# Patient Record
Sex: FEMALE | Race: WHITE | NOT HISPANIC OR LATINO | Employment: OTHER | URBAN - METROPOLITAN AREA
[De-identification: names, ages, dates, MRNs, and addresses within clinical notes are randomized per-mention and may not be internally consistent; named-entity substitution may affect disease eponyms.]

---

## 2017-02-02 ENCOUNTER — OFFICE VISIT (OUTPATIENT)
Dept: FAMILY MEDICINE CLINIC | Facility: CLINIC | Age: 67
End: 2017-02-02

## 2017-02-02 VITALS
SYSTOLIC BLOOD PRESSURE: 130 MMHG | DIASTOLIC BLOOD PRESSURE: 90 MMHG | HEART RATE: 61 BPM | BODY MASS INDEX: 33.65 KG/M2 | OXYGEN SATURATION: 98 % | WEIGHT: 189.9 LBS | HEIGHT: 63 IN

## 2017-02-02 DIAGNOSIS — F33.2 SEVERE EPISODE OF RECURRENT MAJOR DEPRESSIVE DISORDER, WITHOUT PSYCHOTIC FEATURES (HCC): ICD-10-CM

## 2017-02-02 DIAGNOSIS — F51.04 PSYCHOPHYSIOLOGICAL INSOMNIA: ICD-10-CM

## 2017-02-02 DIAGNOSIS — E03.9 ACQUIRED HYPOTHYROIDISM: Primary | ICD-10-CM

## 2017-02-02 PROBLEM — Z98.84 BARIATRIC SURGERY STATUS: Status: ACTIVE | Noted: 2017-02-02

## 2017-02-02 PROCEDURE — 99214 OFFICE O/P EST MOD 30 MIN: CPT | Performed by: INTERNAL MEDICINE

## 2017-02-02 RX ORDER — ESCITALOPRAM OXALATE 10 MG/1
10 TABLET ORAL DAILY
Qty: 30 TABLET | Refills: 2 | Status: SHIPPED | OUTPATIENT
Start: 2017-02-02 | End: 2017-02-23 | Stop reason: SDUPTHER

## 2017-02-02 RX ORDER — TRAZODONE HYDROCHLORIDE 50 MG/1
50 TABLET ORAL NIGHTLY
Qty: 60 TABLET | Refills: 1 | Status: SHIPPED | OUTPATIENT
Start: 2017-02-02 | End: 2017-02-23 | Stop reason: SDUPTHER

## 2017-02-02 NOTE — PROGRESS NOTES
"Subjective   Che Alcocer is a 66 y.o. female who presents today for:    Hypothyroidism (New Patient ); Depression; and Insomnia    Hypothyroidism   This is a chronic problem. Episode onset: 20+ years. Associated symptoms include fatigue.   Depression   Visit Type: follow-up  Patient presents with the following symptoms: anhedonia, depressed mood, excessive worry, feelings of hopelessness, insomnia, suicidal ideas and thoughts of death.  Patient is not experiencing: suicidal planning.  Frequency of symptoms: most days   Severity: causing significant distress     She only recently resumed her T4 supplement (9/2016).    Depression has been severe, worse in the past 2 year.  Did not tolerate Trintellix, her most recent med trial (2016).      Ms. Alcocer  reports that she quit smoking about 5 years ago. Her smoking use included Cigarettes. She has a 15.00 pack-year smoking history. She has never used smokeless tobacco. She reports that she drinks alcohol. She reports that she uses illicit drugs, including Marijuana, about 7 times per week.         Current Outpatient Prescriptions:   •  levothyroxine (SYNTHROID, LEVOTHROID) 50 MCG tablet, Take 1 tablet by mouth Daily., Disp: 90 tablet, Rfl: 1      The following portions of the patient's history were reviewed and updated as appropriate: allergies, current medications, past social history and problem list.    Review of Systems   Constitutional: Positive for fatigue. Negative for unexpected weight change.   Psychiatric/Behavioral: Positive for suicidal ideas. The patient has insomnia.        Objective   Vitals:    02/02/17 0919   BP: 130/90   BP Location: Right arm   Patient Position: Sitting   Cuff Size: Large Adult   Pulse: 61   SpO2: 98%   Weight: 189 lb 14.4 oz (86.1 kg)   Height: 63\" (160 cm)     Physical Exam   Constitutional: She is oriented to person, place, and time. She appears well-developed and well-nourished. No distress.   Obese   Eyes: Conjunctivae are " normal. No scleral icterus.   Neck: Carotid bruit is not present. No thyroid mass and no thyromegaly present.   Cardiovascular: Normal rate, regular rhythm, normal heart sounds and intact distal pulses.    No pedal edema.   Pulmonary/Chest: Effort normal and breath sounds normal.   Abdominal: Soft. Bowel sounds are normal. She exhibits no abdominal bruit.   Lymphadenopathy:     She has no cervical adenopathy.   Neurological: She is alert and oriented to person, place, and time. She has normal strength. Coordination normal.   Psychiatric: She has a normal mood and affect. Her speech is normal and behavior is normal. Judgment and thought content normal.   Tearful during various parts of the exam. Otherwise appropriate         Assessment/Plan   Che was seen today for hypothyroidism, depression and insomnia.    Diagnoses and all orders for this visit:    Acquired hypothyroidism  -     T3, Free  -     T4, Free  -     TSH    Severe episode of recurrent major depressive disorder, without psychotic features  Comments:  ESCITALOPRAM: 1/2 tablet for 4-10 days; increase to 1 tab if no GI side-effects.  Orders:  -     Vitamin B12  -     Folate  -     CBC (No Diff)  -     Iron Profile  -     escitalopram (LEXAPRO) 10 MG tablet; Take 1 tablet by mouth Daily.    Psychophysiological insomnia  Comments:  TRAZODONE: 1/2 tab to 2 tabs at bedtime.  Orders:  -     traZODone (DESYREL) 50 MG tablet; Take 1 tablet by mouth Every Night.

## 2017-02-03 LAB
ERYTHROCYTE [DISTWIDTH] IN BLOOD BY AUTOMATED COUNT: 13.8 % (ref 12.3–15.4)
FOLATE SERPL-MCNC: 13.3 NG/ML
HCT VFR BLD AUTO: 40.5 % (ref 34–46.6)
HGB BLD-MCNC: 13.9 G/DL (ref 11.1–15.9)
IRON SATN MFR SERPL: 21 % (ref 15–55)
IRON SERPL-MCNC: 75 UG/DL (ref 27–139)
MCH RBC QN AUTO: 30.7 PG (ref 26.6–33)
MCHC RBC AUTO-ENTMCNC: 34.3 G/DL (ref 31.5–35.7)
MCV RBC AUTO: 89 FL (ref 79–97)
PLATELET # BLD AUTO: 233 X10E3/UL (ref 150–379)
RBC # BLD AUTO: 4.53 X10E6/UL (ref 3.77–5.28)
T3FREE SERPL-MCNC: 2.9 PG/ML (ref 2–4.4)
T4 FREE SERPL-MCNC: 1.12 NG/DL (ref 0.82–1.77)
TIBC SERPL-MCNC: 355 UG/DL (ref 250–450)
TSH SERPL DL<=0.005 MIU/L-ACNC: 11.14 UIU/ML (ref 0.45–4.5)
UIBC SERPL-MCNC: 280 UG/DL (ref 118–369)
VIT B12 SERPL-MCNC: 584 PG/ML (ref 211–946)
WBC # BLD AUTO: 6.3 X10E3/UL (ref 3.4–10.8)

## 2017-02-23 DIAGNOSIS — F33.2 SEVERE EPISODE OF RECURRENT MAJOR DEPRESSIVE DISORDER, WITHOUT PSYCHOTIC FEATURES (HCC): ICD-10-CM

## 2017-02-23 DIAGNOSIS — F51.04 PSYCHOPHYSIOLOGICAL INSOMNIA: ICD-10-CM

## 2017-02-27 RX ORDER — ESCITALOPRAM OXALATE 10 MG/1
10 TABLET ORAL DAILY
Qty: 90 TABLET | Refills: 0 | Status: SHIPPED | OUTPATIENT
Start: 2017-02-27 | End: 2017-04-27 | Stop reason: SDUPTHER

## 2017-02-27 RX ORDER — TRAZODONE HYDROCHLORIDE 50 MG/1
50 TABLET ORAL NIGHTLY
Qty: 90 TABLET | Refills: 0 | Status: SHIPPED | OUTPATIENT
Start: 2017-02-27 | End: 2017-04-10 | Stop reason: SDUPTHER

## 2017-03-30 ENCOUNTER — OFFICE VISIT (OUTPATIENT)
Dept: FAMILY MEDICINE CLINIC | Facility: CLINIC | Age: 67
End: 2017-03-30

## 2017-03-30 VITALS
HEART RATE: 63 BPM | SYSTOLIC BLOOD PRESSURE: 130 MMHG | OXYGEN SATURATION: 98 % | BODY MASS INDEX: 32.78 KG/M2 | HEIGHT: 63 IN | DIASTOLIC BLOOD PRESSURE: 80 MMHG | WEIGHT: 185 LBS

## 2017-03-30 DIAGNOSIS — M25.552 LEFT HIP PAIN: Primary | ICD-10-CM

## 2017-03-30 DIAGNOSIS — F51.04 PSYCHOPHYSIOLOGICAL INSOMNIA: ICD-10-CM

## 2017-03-30 DIAGNOSIS — E03.9 ACQUIRED HYPOTHYROIDISM: ICD-10-CM

## 2017-03-30 DIAGNOSIS — R41.3 MEMORY CHANGE: ICD-10-CM

## 2017-03-30 PROCEDURE — 73502 X-RAY EXAM HIP UNI 2-3 VIEWS: CPT | Performed by: INTERNAL MEDICINE

## 2017-03-30 PROCEDURE — 99214 OFFICE O/P EST MOD 30 MIN: CPT | Performed by: INTERNAL MEDICINE

## 2017-03-30 RX ORDER — MELATONIN
1000 DAILY
COMMUNITY
End: 2018-08-30 | Stop reason: SDUPTHER

## 2017-03-30 RX ORDER — FERROUS SULFATE TAB EC 324 MG (65 MG FE EQUIVALENT) 324 (65 FE) MG
324 TABLET DELAYED RESPONSE ORAL
COMMUNITY
End: 2018-08-30 | Stop reason: SDUPTHER

## 2017-03-30 RX ORDER — UBIDECARENONE 75 MG
500 CAPSULE ORAL DAILY
COMMUNITY
End: 2017-09-13

## 2017-03-30 NOTE — PROGRESS NOTES
Subjective   Che Alccoer is a 66 y.o. female who presents today for:    Hypothyroidism; Insomnia; and Hip Pain (L hip/groin area )    History of Present Illness   Left hip pain for several months, but worse/more constant/more intense x 1 month. Worse with ambulation, but also present (aches) while sitting after it starts.  No trauma, but it followed her move (bending and lifting boxes).      Hypothyroidism   This is a chronic problem. Episode onset: 20+ years. Associated symptoms include fatigue.  Back on T4  for the past 2 months, after running out late last year.    Ms. Alcocer  reports that she quit smoking about 5 years ago. Her smoking use included Cigarettes. She has a 15.00 pack-year smoking history. She has never used smokeless tobacco. She reports that she drinks alcohol. She reports that she uses illicit drugs, including Marijuana, about 7 times per week.         Current Outpatient Prescriptions:   •  Calcium Carbonate-Vitamin D (CALCIUM 500/D PO), Take 1 tablet by mouth Daily., Disp: , Rfl:   •  cholecalciferol (VITAMIN D3) 1000 UNITS tablet, Take 1,000 Units by mouth Daily., Disp: , Rfl:   •  escitalopram (LEXAPRO) 10 MG tablet, Take 1 tablet by mouth Daily., Disp: 90 tablet, Rfl: 0  •  ferrous sulfate 324 (65 FE) MG tablet delayed-release EC tablet, Take 324 mg by mouth Daily With Breakfast., Disp: , Rfl:   •  levothyroxine (SYNTHROID, LEVOTHROID) 50 MCG tablet, Take 1 tablet by mouth Daily., Disp: 90 tablet, Rfl: 1  •  vitamin B-12 (CYANOCOBALAMIN) 100 MCG tablet, Take 500 mcg by mouth Daily., Disp: , Rfl:   •  traZODone (DESYREL) 50 MG tablet, Take 1 tablet by mouth Every Night. (Patient taking differently: Take 150 mg by mouth Every Night.), Disp: 90 tablet, Rfl: 0      The following portions of the patient's history were reviewed and updated as appropriate: allergies, current medications, past social history and problem list.    Review of Systems   Constitutional: Negative for unexpected weight  "change.   Gastrointestinal: Negative for constipation.   Endocrine: Negative for cold intolerance and heat intolerance.   Musculoskeletal: Positive for arthralgias. Negative for back pain.   Hematological: Negative for adenopathy.   Psychiatric/Behavioral: Positive for confusion (mild, x a few years, worse x 6 months) and sleep disturbance.       Objective   Vitals:    03/30/17 1111   BP: 130/80   BP Location: Left arm   Patient Position: Sitting   Cuff Size: Adult   Pulse: 63   SpO2: 98%   Weight: 185 lb (83.9 kg)   Height: 63\" (160 cm)     Physical Exam  Obese, in NAD.  A&O x 4; answers all questions appropriately.  No thyromegaly or mass.  No inguinal LAD; NT to palp at the groin.  No inguinal hernia.  Pain with flexion and internal rotation at the hip.  Tight hamstrings bilaterally; negative SLR bilaterally.    Assessment/Plan   Che was seen today for hypothyroidism, insomnia and hip pain.    Diagnoses and all orders for this visit:    Left hip pain  -     XR Hip With or Without Pelvis 2 - 3 View Left: Mild to moderate joint space narrowing c/w OA.  I await the radiologist's final read; I have no films for comparison.  Will likely try a 4-week NSAID trial.  Additional recommendations will follow after the report.    Acquired hypothyroidism  -     T3, Free  -     T4, Free  -     TSH  Adjust dose as indicated.    Psychophysiological insomnia    Memory change  -     Methylmalonic Acid, Serum  -     Cancel: Vitamin B12  -     Cancel: Folate    Recent labs were normal. Check an MMA.  Memory issues may be related to undertreated hypothyroidism.  Trazodone may be contributing to it, also.  Will evaluate further at f/up appt in 6 weeks.  "

## 2017-04-03 LAB
FOLATE SERPL-MCNC: 8.55 NG/ML (ref 4.78–24.2)
METHYLMALONATE SERPL-SCNC: 125 NMOL/L (ref 0–378)
T3FREE SERPL-MCNC: 2.3 PG/ML (ref 2–4.4)
T4 FREE SERPL-MCNC: 1.18 NG/DL (ref 0.93–1.7)
TSH SERPL DL<=0.005 MIU/L-ACNC: 6.48 MIU/ML (ref 0.27–4.2)
VIT B12 SERPL-MCNC: 660 PG/ML (ref 211–946)

## 2017-04-06 DIAGNOSIS — E03.9 ACQUIRED HYPOTHYROIDISM: ICD-10-CM

## 2017-04-06 RX ORDER — LEVOTHYROXINE SODIUM 0.05 MG/1
75 TABLET ORAL DAILY
Qty: 90 TABLET | Refills: 0 | Status: SHIPPED | OUTPATIENT
Start: 2017-04-06 | End: 2017-04-07 | Stop reason: DRUGHIGH

## 2017-04-07 DIAGNOSIS — E03.9 ACQUIRED HYPOTHYROIDISM: Primary | ICD-10-CM

## 2017-04-07 RX ORDER — LEVOTHYROXINE SODIUM 0.07 MG/1
75 TABLET ORAL DAILY
Qty: 90 TABLET | Refills: 0 | Status: CANCELLED | OUTPATIENT
Start: 2017-04-07

## 2017-04-10 DIAGNOSIS — M87.059 AVN OF FEMUR (HCC): Primary | ICD-10-CM

## 2017-04-10 DIAGNOSIS — Z98.84 BARIATRIC SURGERY STATUS: ICD-10-CM

## 2017-04-10 DIAGNOSIS — E03.9 ACQUIRED HYPOTHYROIDISM: ICD-10-CM

## 2017-04-10 DIAGNOSIS — F51.04 PSYCHOPHYSIOLOGICAL INSOMNIA: ICD-10-CM

## 2017-04-10 RX ORDER — LEVOTHYROXINE SODIUM 0.05 MG/1
50 TABLET ORAL DAILY
Qty: 30 TABLET | Refills: 1 | Status: SHIPPED | OUTPATIENT
Start: 2017-04-10 | End: 2017-04-11 | Stop reason: SDUPTHER

## 2017-04-10 RX ORDER — TRAZODONE HYDROCHLORIDE 50 MG/1
TABLET ORAL
Qty: 90 TABLET | Refills: 1 | Status: SHIPPED | OUTPATIENT
Start: 2017-04-10 | End: 2017-04-11 | Stop reason: SDUPTHER

## 2017-04-10 NOTE — PROGRESS NOTES
Discussed with patient the results of her films.  We will refer her to Dr. Pereira for additional evaluation and recommendations for AVN.    Also discussed lab results.  Thyroid function studies are better on the T4 now that she is taking it more regularly.  TSH remains mildly elevated.  We will reassess this in 4 months.

## 2017-04-11 DIAGNOSIS — F51.04 PSYCHOPHYSIOLOGICAL INSOMNIA: ICD-10-CM

## 2017-04-12 RX ORDER — LEVOTHYROXINE SODIUM 0.05 MG/1
50 TABLET ORAL DAILY
Qty: 90 TABLET | Refills: 1 | Status: SHIPPED | OUTPATIENT
Start: 2017-04-12 | End: 2017-08-02 | Stop reason: SDUPTHER

## 2017-04-12 RX ORDER — TRAZODONE HYDROCHLORIDE 50 MG/1
TABLET ORAL
Qty: 270 TABLET | Refills: 1 | Status: SHIPPED | OUTPATIENT
Start: 2017-04-12 | End: 2017-10-27 | Stop reason: SDUPTHER

## 2017-04-27 DIAGNOSIS — F33.2 SEVERE EPISODE OF RECURRENT MAJOR DEPRESSIVE DISORDER, WITHOUT PSYCHOTIC FEATURES (HCC): ICD-10-CM

## 2017-04-27 RX ORDER — ESCITALOPRAM OXALATE 10 MG/1
10 TABLET ORAL DAILY
Qty: 90 TABLET | Refills: 1 | Status: SHIPPED | OUTPATIENT
Start: 2017-04-27 | End: 2017-10-14 | Stop reason: SDUPTHER

## 2017-05-30 ENCOUNTER — OFFICE VISIT (OUTPATIENT)
Dept: FAMILY MEDICINE CLINIC | Facility: CLINIC | Age: 67
End: 2017-05-30

## 2017-05-30 VITALS
HEIGHT: 63 IN | WEIGHT: 188.4 LBS | BODY MASS INDEX: 33.38 KG/M2 | OXYGEN SATURATION: 98 % | DIASTOLIC BLOOD PRESSURE: 84 MMHG | SYSTOLIC BLOOD PRESSURE: 124 MMHG | HEART RATE: 69 BPM

## 2017-05-30 DIAGNOSIS — M87.059 AVN OF FEMUR (HCC): ICD-10-CM

## 2017-05-30 DIAGNOSIS — F33.2 SEVERE EPISODE OF RECURRENT MAJOR DEPRESSIVE DISORDER, WITHOUT PSYCHOTIC FEATURES (HCC): ICD-10-CM

## 2017-05-30 DIAGNOSIS — Z01.818 PRE-OP EVALUATION: Primary | ICD-10-CM

## 2017-05-30 DIAGNOSIS — E03.9 ACQUIRED HYPOTHYROIDISM: ICD-10-CM

## 2017-05-30 DIAGNOSIS — M16.12 PRIMARY OSTEOARTHRITIS OF LEFT HIP: ICD-10-CM

## 2017-05-30 PROCEDURE — 99213 OFFICE O/P EST LOW 20 MIN: CPT | Performed by: INTERNAL MEDICINE

## 2017-07-31 ENCOUNTER — RESULTS ENCOUNTER (OUTPATIENT)
Dept: FAMILY MEDICINE CLINIC | Facility: CLINIC | Age: 67
End: 2017-07-31

## 2017-07-31 DIAGNOSIS — F51.04 PSYCHOPHYSIOLOGICAL INSOMNIA: ICD-10-CM

## 2017-07-31 DIAGNOSIS — M87.059 AVN OF FEMUR (HCC): ICD-10-CM

## 2017-07-31 DIAGNOSIS — Z98.84 BARIATRIC SURGERY STATUS: ICD-10-CM

## 2017-07-31 DIAGNOSIS — E03.9 ACQUIRED HYPOTHYROIDISM: ICD-10-CM

## 2017-08-02 RX ORDER — LEVOTHYROXINE SODIUM 0.05 MG/1
50 TABLET ORAL DAILY
Qty: 90 TABLET | Refills: 1 | Status: SHIPPED | OUTPATIENT
Start: 2017-08-02 | End: 2017-09-13 | Stop reason: SDUPTHER

## 2017-09-07 LAB
ERYTHROCYTE [DISTWIDTH] IN BLOOD BY AUTOMATED COUNT: 13.2 % (ref 11.7–13)
HCT VFR BLD AUTO: 39.9 % (ref 35.6–45.5)
HGB BLD-MCNC: 12.6 G/DL (ref 11.9–15.5)
MCH RBC QN AUTO: 28 PG (ref 26.9–32)
MCHC RBC AUTO-ENTMCNC: 31.6 G/DL (ref 32.4–36.3)
MCV RBC AUTO: 88.7 FL (ref 80.5–98.2)
PLATELET # BLD AUTO: 272 10*3/MM3 (ref 140–500)
RBC # BLD AUTO: 4.5 10*6/MM3 (ref 3.9–5.2)
T3FREE SERPL-MCNC: 2.4 PG/ML (ref 2–4.4)
T4 FREE SERPL-MCNC: 1.12 NG/DL (ref 0.93–1.7)
TSH SERPL DL<=0.005 MIU/L-ACNC: 6.26 MIU/ML (ref 0.27–4.2)
WBC # BLD AUTO: 6.54 10*3/MM3 (ref 4.5–10.7)

## 2017-09-13 ENCOUNTER — OFFICE VISIT (OUTPATIENT)
Dept: FAMILY MEDICINE CLINIC | Facility: CLINIC | Age: 67
End: 2017-09-13

## 2017-09-13 VITALS
OXYGEN SATURATION: 99 % | DIASTOLIC BLOOD PRESSURE: 80 MMHG | HEIGHT: 63 IN | SYSTOLIC BLOOD PRESSURE: 128 MMHG | HEART RATE: 63 BPM | WEIGHT: 182.6 LBS | BODY MASS INDEX: 32.36 KG/M2

## 2017-09-13 DIAGNOSIS — F33.42 RECURRENT MAJOR DEPRESSIVE DISORDER, IN FULL REMISSION (HCC): ICD-10-CM

## 2017-09-13 DIAGNOSIS — E03.9 ACQUIRED HYPOTHYROIDISM: Primary | ICD-10-CM

## 2017-09-13 DIAGNOSIS — Z98.84 BARIATRIC SURGERY STATUS: ICD-10-CM

## 2017-09-13 PROCEDURE — 99214 OFFICE O/P EST MOD 30 MIN: CPT | Performed by: INTERNAL MEDICINE

## 2017-09-13 RX ORDER — LEVOTHYROXINE SODIUM 0.07 MG/1
75 TABLET ORAL DAILY
Qty: 90 TABLET | Refills: 1 | Status: SHIPPED | OUTPATIENT
Start: 2017-09-13 | End: 2017-12-28 | Stop reason: SDUPTHER

## 2017-09-13 NOTE — PROGRESS NOTES
Subjective   Che Alcocer is a 67 y.o. female who presents today for:    Hypothyroidism (F/u & review labs)    History of Present Illness   She has had hypothyroidism for 20+ years.  She was off her medicines when she saw us in February.  She thought her dose was 150 µg, but was not sure.  We started her 50 µg and recheck labs in March.  Her TSH was still mildly elevated, but her free T4 and free T3 levels were normal.  We did not change the dose at that time.    She has chronic major depressionthat had been tretaed with multiple meds before starting Lexapro in early 2017.  She has responded well to this, and to her ARTEM for AVN and resumptin of her thyroid hormone (as above).      She also has insomnia treated with trazodone since earlier this year.  She sleeps much better with 2 or 3 trazodone tablets at bedtime.      She is s/p bariatric surgery (Edvin-en-y).  She has not been taking her B12 supplement for about a year.  She also stopped taking her calcium/vitamin D and her iron.    Ms. Alcocer  reports that she quit smoking about 5 years ago. Her smoking use included Cigarettes. She has a 15.00 pack-year smoking history. She has never used smokeless tobacco. She reports that she drinks alcohol. She reports that she uses illicit drugs, including Marijuana, about 7 times per week.     Allergies   Allergen Reactions   • Penicillins        Current Outpatient Prescriptions:   •  Calcium Carbonate-Vitamin D (CALCIUM 500/D PO), Take 1 tablet by mouth Daily., Disp: , Rfl:   •  cholecalciferol (VITAMIN D3) 1000 UNITS tablet, Take 1,000 Units by mouth Daily., Disp: , Rfl:   •  escitalopram (LEXAPRO) 10 MG tablet, Take 1 tablet by mouth Daily., Disp: 90 tablet, Rfl: 1  •  ferrous sulfate 324 (65 FE) MG tablet delayed-release EC tablet, Take 324 mg by mouth Daily With Breakfast., Disp: , Rfl:   •  levothyroxine (SYNTHROID) 50 MCG tablet, Take 1 tablet by mouth Daily., Disp: 90 tablet, Rfl: 1  •  traZODone (DESYREL) 50 MG  "tablet, 2-3 tablets at bedtime for insomnia, Disp: 270 tablet, Rfl: 1      Review of Systems   HENT: Positive for rhinorrhea. Negative for congestion.    Cardiovascular: Negative for chest pain, palpitations and leg swelling.   Musculoskeletal: Negative for arthralgias.   Allergic/Immunologic: Positive for environmental allergies.   Psychiatric/Behavioral: Positive for dysphoric mood (better on the medicine) and sleep disturbance (better with trazodone). The patient is not nervous/anxious.          Objective   Vitals:    09/13/17 1151   BP: 128/80   BP Location: Right arm   Patient Position: Sitting   Cuff Size: Adult   Pulse: 63   SpO2: 99%   Weight: 182 lb 9.6 oz (82.8 kg)   Height: 63\" (160 cm)     Physical Exam    Obese female in no acute distress.  She is upbeat, and her affect is appropriate.  No periorbital edema.  No thyromegaly or mass.  No thyroid tenderness.  Regular rate and rhythm with a 1/6 holosystolic murmur at the right upper sternal border and left upper sternal border; there is a 1/6 early diastolic murmur at the left sternal border.  Normal S1 and S2.  No pedal edema.  Pedal pulses are full bilaterally.  Monofilament test is normal in both feet.  There is no gross motor neurologic deficit.      Assessment/Plan   Che was seen today for hypothyroidism.    Diagnoses and all orders for this visit:    Acquired hypothyroidism  -     T3, Free; Future  -     T4, Free; Future  -     TSH; Future    -     levothyroxine (SYNTHROID, LEVOTHROID) 75 MCG tablet; Take 1 tablet by mouth Daily.      Bariatric surgery status  -     Vitamin B12; Future  -     Methylmalonic Acid, Serum; Future  -     Iron Profile; Future  -     Vitamin D 25 Hydroxy; Future    Recurrent major depressive disorder, in full remission      Labs were reviewed with the patient today.  TSH remains slightly elevated, so we will increase her thyroid hormone dose.  We will recheck labs in approximately 2 months.    Depression is " well-controlled with resumption of the thyroid hormone and with her Lexapro.  Restoration of her normal function without pain while ambulating has also done a lot for her mood.    We discussed the need for continued vitamin supplementation in light of her gastric bypass.  She will get back on the B12, iron, and calcium/vitamin D.  We will recheck levels at the time of her labs in 2 months.

## 2017-10-14 DIAGNOSIS — F33.2 SEVERE EPISODE OF RECURRENT MAJOR DEPRESSIVE DISORDER, WITHOUT PSYCHOTIC FEATURES (HCC): ICD-10-CM

## 2017-10-16 RX ORDER — ESCITALOPRAM OXALATE 10 MG/1
TABLET ORAL
Qty: 90 TABLET | Refills: 0 | Status: SHIPPED | OUTPATIENT
Start: 2017-10-16 | End: 2018-02-15 | Stop reason: SDUPTHER

## 2017-10-27 DIAGNOSIS — F51.04 PSYCHOPHYSIOLOGICAL INSOMNIA: ICD-10-CM

## 2017-11-01 RX ORDER — TRAZODONE HYDROCHLORIDE 50 MG/1
TABLET ORAL
Qty: 270 TABLET | Refills: 0 | Status: SHIPPED | OUTPATIENT
Start: 2017-11-01 | End: 2017-11-13 | Stop reason: SDUPTHER

## 2017-11-13 ENCOUNTER — RESULTS ENCOUNTER (OUTPATIENT)
Dept: FAMILY MEDICINE CLINIC | Facility: CLINIC | Age: 67
End: 2017-11-13

## 2017-11-13 DIAGNOSIS — F51.04 PSYCHOPHYSIOLOGICAL INSOMNIA: ICD-10-CM

## 2017-11-13 DIAGNOSIS — E03.9 ACQUIRED HYPOTHYROIDISM: ICD-10-CM

## 2017-11-13 DIAGNOSIS — Z98.84 BARIATRIC SURGERY STATUS: ICD-10-CM

## 2017-11-13 RX ORDER — TRAZODONE HYDROCHLORIDE 50 MG/1
150 TABLET ORAL NIGHTLY
Qty: 270 TABLET | Refills: 0 | Status: SHIPPED | OUTPATIENT
Start: 2017-11-13 | End: 2017-12-26 | Stop reason: SDUPTHER

## 2017-11-17 LAB
25(OH)D3+25(OH)D2 SERPL-MCNC: 36.3 NG/ML (ref 30–100)
IRON SATN MFR SERPL: 22 % (ref 15–55)
IRON SERPL-MCNC: 104 UG/DL (ref 27–139)
METHYLMALONATE SERPL-SCNC: 90 NMOL/L (ref 0–378)
T3FREE SERPL-MCNC: 2.2 PG/ML (ref 2–4.4)
T4 FREE SERPL-MCNC: 1.31 NG/DL (ref 0.82–1.77)
TIBC SERPL-MCNC: 466 UG/DL (ref 250–450)
TSH SERPL DL<=0.005 MIU/L-ACNC: 4.71 UIU/ML (ref 0.45–4.5)
UIBC SERPL-MCNC: 362 UG/DL (ref 118–369)
VIT B12 SERPL-MCNC: >2000 PG/ML (ref 211–946)

## 2017-12-26 DIAGNOSIS — F51.04 PSYCHOPHYSIOLOGICAL INSOMNIA: ICD-10-CM

## 2017-12-26 RX ORDER — TRAZODONE HYDROCHLORIDE 50 MG/1
150 TABLET ORAL NIGHTLY
Qty: 270 TABLET | Refills: 0 | Status: SHIPPED | OUTPATIENT
Start: 2017-12-26 | End: 2017-12-28 | Stop reason: SDUPTHER

## 2017-12-28 DIAGNOSIS — F51.04 PSYCHOPHYSIOLOGICAL INSOMNIA: ICD-10-CM

## 2017-12-29 RX ORDER — LEVOTHYROXINE SODIUM 0.07 MG/1
75 TABLET ORAL DAILY
Qty: 90 TABLET | Refills: 1 | Status: SHIPPED | OUTPATIENT
Start: 2017-12-29 | End: 2018-05-07 | Stop reason: DRUGHIGH

## 2017-12-29 RX ORDER — TRAZODONE HYDROCHLORIDE 50 MG/1
150 TABLET ORAL NIGHTLY
Qty: 270 TABLET | Refills: 1 | Status: SHIPPED | OUTPATIENT
Start: 2017-12-29 | End: 2018-07-31 | Stop reason: SDUPTHER

## 2018-02-15 DIAGNOSIS — F33.2 SEVERE EPISODE OF RECURRENT MAJOR DEPRESSIVE DISORDER, WITHOUT PSYCHOTIC FEATURES (HCC): ICD-10-CM

## 2018-02-15 RX ORDER — ESCITALOPRAM OXALATE 10 MG/1
TABLET ORAL
Qty: 30 TABLET | Refills: 2 | Status: SHIPPED | OUTPATIENT
Start: 2018-02-15 | End: 2018-03-21 | Stop reason: SDUPTHER

## 2018-03-21 DIAGNOSIS — F33.2 SEVERE EPISODE OF RECURRENT MAJOR DEPRESSIVE DISORDER, WITHOUT PSYCHOTIC FEATURES (HCC): ICD-10-CM

## 2018-03-21 RX ORDER — ESCITALOPRAM OXALATE 10 MG/1
10 TABLET ORAL DAILY
Qty: 90 TABLET | Refills: 0 | Status: SHIPPED | OUTPATIENT
Start: 2018-03-21 | End: 2018-04-09

## 2018-03-30 RX ORDER — LEVOTHYROXINE SODIUM 0.05 MG/1
50 TABLET ORAL DAILY
Qty: 90 TABLET | Refills: 1 | Status: SHIPPED | OUTPATIENT
Start: 2018-03-30 | End: 2018-05-07 | Stop reason: SDUPTHER

## 2018-04-09 ENCOUNTER — OFFICE VISIT (OUTPATIENT)
Dept: FAMILY MEDICINE CLINIC | Facility: CLINIC | Age: 68
End: 2018-04-09

## 2018-04-09 VITALS
WEIGHT: 183.7 LBS | OXYGEN SATURATION: 98 % | TEMPERATURE: 98.3 F | BODY MASS INDEX: 32.55 KG/M2 | DIASTOLIC BLOOD PRESSURE: 98 MMHG | SYSTOLIC BLOOD PRESSURE: 154 MMHG | HEIGHT: 63 IN | HEART RATE: 68 BPM

## 2018-04-09 DIAGNOSIS — M25.552 PAIN OF LEFT HIP JOINT: Primary | ICD-10-CM

## 2018-04-09 PROCEDURE — 99213 OFFICE O/P EST LOW 20 MIN: CPT | Performed by: NURSE PRACTITIONER

## 2018-04-09 NOTE — PROGRESS NOTES
Subjective   Che Alcocer is a 67 y.o. female who presents today for:    Hip Pain (A week ago started feeling pain.  In dec ortho said they would take care of it)    Ms. Mayers presents today with complaints of left hip pain x 1 week. She had a left total hip replacement in June 2017 by Dr. Pereira. She states she recovered without any difficulty despite having a hematoma on her left upper thigh. She states she had seen Dr. Pereira in December 2017 and told him she was having some discomfort in her left upper thigh. She states he said her could fix that for her but she never followed up with him. She states she cannot recline with out pain/pulling of her left upper thigh muscles, she can't sit without pain in her left buttocks and she has pain in her groin. She states the pain is worse at night and rates it as a 10/10. She has been taking Ibuprofen for the pain. She denies difficulty ambulating. She states she has an appointment with Dr. Pereira on Wednesday 4/11/18 to be evaluated for these symptoms.  She is a patient of Dr. George, this is the first time I have seen her and this is a new problem to me.    I have reviewed the patient's medical history in detail and updated the computerized patient record.       Ms. Alcocer  reports that she quit smoking about 6 years ago. Her smoking use included Cigarettes. She has a 15.00 pack-year smoking history. She has never used smokeless tobacco. She reports that she drinks alcohol. She reports that she uses drugs, including Marijuana, about 7 times per week.     Allergies   Allergen Reactions   • Penicillins        Current Outpatient Prescriptions:   •  Calcium Carbonate-Vitamin D (CALCIUM 500/D PO), Take 1 tablet by mouth Daily., Disp: , Rfl:   •  cholecalciferol (VITAMIN D3) 1000 UNITS tablet, Take 1,000 Units by mouth Daily., Disp: , Rfl:   •  ferrous sulfate 324 (65 FE) MG tablet delayed-release EC tablet, Take 324 mg by mouth Daily With Breakfast., Disp: , Rfl:   •   "levothyroxine (SYNTHROID, LEVOTHROID) 50 MCG tablet, TAKE 1 TABLET BY MOUTH DAILY., Disp: 90 tablet, Rfl: 1  •  traZODone (DESYREL) 50 MG tablet, Take 3 tablets by mouth Every Night., Disp: 270 tablet, Rfl: 1  •  levothyroxine (SYNTHROID, LEVOTHROID) 75 MCG tablet, Take 1 tablet by mouth Daily., Disp: 90 tablet, Rfl: 1      Review of Systems   Constitutional: Negative.    Musculoskeletal: Positive for arthralgias (left hip pain, ) and myalgias (pain in left upper thigh, groin and buttocks).   Skin: Negative.    Neurological: Negative.    Hematological:        Hematoma in left upper thigh.          Objective   Vitals:    04/09/18 1322   BP: 154/98   BP Location: Left arm   Patient Position: Sitting   Cuff Size: Adult   Pulse: 68   Temp: 98.3 °F (36.8 °C)   TempSrc: Oral   SpO2: 98%   Weight: 83.3 kg (183 lb 11.2 oz)   Height: 160 cm (62.99\")     Physical Exam   Constitutional: She is oriented to person, place, and time. She appears well-developed and well-nourished.   Musculoskeletal:        Left hip: She exhibits normal range of motion, normal strength, no tenderness and no swelling.        Left upper leg: She exhibits no tenderness, no swelling and no edema.   Left upper thigh is the incision from the hip replacement.   Neurological: She is alert and oriented to person, place, and time.   Skin: Skin is warm and dry.   No bruising/hematoma noted   Vitals reviewed.            Che was seen today for hip pain.    Diagnoses and all orders for this visit:    Pain of left hip joint  -     diclofenac sodium (VOTAREN XR) 100 MG 24 hr tablet; Take 1 tablet by mouth Daily.    She is to follow up with Dr. Pereira her orthopedic surgeon as scheduled this week. Since the Ibuprofen is not help her pain, especially at night, she can start Diclofenac  mg daily prior to going to bed.   She is to follow up as needed.  "

## 2018-04-11 DIAGNOSIS — F33.42 RECURRENT MAJOR DEPRESSIVE DISORDER, IN FULL REMISSION (HCC): ICD-10-CM

## 2018-04-11 DIAGNOSIS — E03.9 ACQUIRED HYPOTHYROIDISM: Primary | ICD-10-CM

## 2018-04-11 DIAGNOSIS — Z98.84 BARIATRIC SURGERY STATUS: ICD-10-CM

## 2018-04-14 LAB
ALBUMIN SERPL-MCNC: 4.6 G/DL (ref 3.5–5.2)
ALBUMIN/GLOB SERPL: 1.8 G/DL
ALP SERPL-CCNC: 88 U/L (ref 39–117)
ALT SERPL-CCNC: 15 U/L (ref 1–33)
AST SERPL-CCNC: 24 U/L (ref 1–32)
BASOPHILS # BLD AUTO: 0.04 10*3/MM3 (ref 0–0.2)
BASOPHILS NFR BLD AUTO: 0.7 % (ref 0–1.5)
BILIRUB SERPL-MCNC: 0.6 MG/DL (ref 0.1–1.2)
BUN SERPL-MCNC: 14 MG/DL (ref 8–23)
BUN/CREAT SERPL: 18.4 (ref 7–25)
CALCIUM SERPL-MCNC: 9.3 MG/DL (ref 8.6–10.5)
CHLORIDE SERPL-SCNC: 100 MMOL/L (ref 98–107)
CO2 SERPL-SCNC: 22.9 MMOL/L (ref 22–29)
CREAT SERPL-MCNC: 0.76 MG/DL (ref 0.57–1)
CRP SERPL-MCNC: <0.03 MG/DL (ref 0–0.5)
EOSINOPHIL # BLD AUTO: 0.12 10*3/MM3 (ref 0–0.7)
EOSINOPHIL NFR BLD AUTO: 2 % (ref 0.3–6.2)
ERYTHROCYTE [DISTWIDTH] IN BLOOD BY AUTOMATED COUNT: 13.2 % (ref 11.7–13)
ERYTHROCYTE [SEDIMENTATION RATE] IN BLOOD BY WESTERGREN METHOD: 3 MM/HR (ref 0–30)
FOLATE SERPL-MCNC: 7.6 NG/ML (ref 4.78–24.2)
GFR SERPLBLD CREATININE-BSD FMLA CKD-EPI: 76 ML/MIN/1.73
GFR SERPLBLD CREATININE-BSD FMLA CKD-EPI: 92 ML/MIN/1.73
GLOBULIN SER CALC-MCNC: 2.6 GM/DL
GLUCOSE SERPL-MCNC: 96 MG/DL (ref 65–99)
HCT VFR BLD AUTO: 44.5 % (ref 35.6–45.5)
HGB BLD-MCNC: 14.7 G/DL (ref 11.9–15.5)
IMM GRANULOCYTES # BLD: 0 10*3/MM3 (ref 0–0.03)
IMM GRANULOCYTES NFR BLD: 0 % (ref 0–0.5)
LYMPHOCYTES # BLD AUTO: 1.08 10*3/MM3 (ref 0.9–4.8)
LYMPHOCYTES NFR BLD AUTO: 17.9 % (ref 19.6–45.3)
MCH RBC QN AUTO: 30.6 PG (ref 26.9–32)
MCHC RBC AUTO-ENTMCNC: 33 G/DL (ref 32.4–36.3)
MCV RBC AUTO: 92.5 FL (ref 80.5–98.2)
METHYLMALONATE SERPL-SCNC: 73 NMOL/L (ref 0–378)
MONOCYTES # BLD AUTO: 0.42 10*3/MM3 (ref 0.2–1.2)
MONOCYTES NFR BLD AUTO: 7 % (ref 5–12)
NEUTROPHILS # BLD AUTO: 4.37 10*3/MM3 (ref 1.9–8.1)
NEUTROPHILS NFR BLD AUTO: 72.4 % (ref 42.7–76)
PLATELET # BLD AUTO: 241 10*3/MM3 (ref 140–500)
POTASSIUM SERPL-SCNC: 4.2 MMOL/L (ref 3.5–5.2)
PROT SERPL-MCNC: 7.2 G/DL (ref 6–8.5)
RBC # BLD AUTO: 4.81 10*6/MM3 (ref 3.9–5.2)
SODIUM SERPL-SCNC: 141 MMOL/L (ref 136–145)
T3FREE SERPL-MCNC: 3.6 PG/ML (ref 2–4.4)
T4 FREE SERPL-MCNC: 1.66 NG/DL (ref 0.93–1.7)
TSH SERPL DL<=0.005 MIU/L-ACNC: 6.81 MIU/ML (ref 0.27–4.2)
VIT B12 SERPL-MCNC: >2000 PG/ML (ref 211–946)
WBC # BLD AUTO: 6.03 10*3/MM3 (ref 4.5–10.7)

## 2018-05-07 ENCOUNTER — OFFICE VISIT (OUTPATIENT)
Dept: FAMILY MEDICINE CLINIC | Facility: CLINIC | Age: 68
End: 2018-05-07

## 2018-05-07 VITALS
SYSTOLIC BLOOD PRESSURE: 132 MMHG | OXYGEN SATURATION: 98 % | HEIGHT: 63 IN | BODY MASS INDEX: 31.01 KG/M2 | DIASTOLIC BLOOD PRESSURE: 88 MMHG | HEART RATE: 63 BPM | WEIGHT: 175 LBS

## 2018-05-07 DIAGNOSIS — F33.42 RECURRENT MAJOR DEPRESSIVE DISORDER, IN FULL REMISSION (HCC): ICD-10-CM

## 2018-05-07 DIAGNOSIS — E03.9 ACQUIRED HYPOTHYROIDISM: Primary | ICD-10-CM

## 2018-05-07 PROCEDURE — 99213 OFFICE O/P EST LOW 20 MIN: CPT | Performed by: INTERNAL MEDICINE

## 2018-05-07 RX ORDER — IBUPROFEN 200 MG
200 TABLET ORAL EVERY 6 HOURS PRN
COMMUNITY

## 2018-05-07 RX ORDER — LEVOTHYROXINE SODIUM 0.07 MG/1
75 TABLET ORAL DAILY
Qty: 90 TABLET | Refills: 1 | Status: SHIPPED | OUTPATIENT
Start: 2018-05-07 | End: 2018-11-04 | Stop reason: SDUPTHER

## 2018-05-07 NOTE — PROGRESS NOTES
Hypothyroidism (8 month f/u & review labs) and Depression    She has had hypothyroidism for 20+ years.  She was off her medicines when she saw us in February.  She thought her dose was 150 µg, but was not sure.  We started her 50 µg and recheck labs in March.  Her TSH was still mildly elevated, but her free T4 and free T3 levels were normal.  We did not change the dose at that time, but did increase her levothyroxine to 75 µg in the fall of 2017 when her TSH remained elevated.       She has chronic major depressionthat had been tretaed with multiple meds before starting Lexapro in early 2017.  She has responded well to this, and to her ARTEM for AVN and resumptin of her thyroid hormone (as above).       She also has insomnia treated with trazodone since earlier this year.  She sleeps much better with 2 or 3 trazodone tablets at bedtime.      She is s/p bariatric surgery (Edvin-en-y).   After bailing on her supplements, she is now faithfully taking her calcium and vitamin D, her vitamin B12, and her iron.    Allergies   Allergen Reactions   • Penicillins Other (See Comments)     Was told as a child to not take it     Current Outpatient Prescriptions:   •  Calcium Carbonate-Vitamin D (CALCIUM 500/D PO), Take 1 tablet by mouth Daily., Disp: , Rfl:   •  cholecalciferol (VITAMIN D3) 1000 UNITS tablet, Take 1,000 Units by mouth Daily., Disp: , Rfl:   •  ferrous sulfate 324 (65 FE) MG tablet delayed-release EC tablet, Take 324 mg by mouth Daily With Breakfast., Disp: , Rfl:   •  ibuprofen (ADVIL,MOTRIN) 200 MG tablet, Take 200 mg by mouth Every 6 (Six) Hours As Needed for Mild Pain ., Disp: , Rfl:   •  levothyroxine (SYNTHROID, LEVOTHROID) 75 MCG tablet, Take 1 tablet by mouth Daily., Disp: 90 tablet, Rfl: 1  •  traZODone (DESYREL) 50 MG tablet, Take 3 tablets by mouth Every Night., Disp: 270 tablet, Rfl: 1  •  diclofenac sodium (VOTAREN XR) 100 MG 24 hr tablet, Take 1 tablet by mouth Daily., Disp: 30 tablet, Rfl: 3    She   "reports that she quit smoking about 6 years ago. Her smoking use included Cigarettes. She has a 15.00 pack-year smoking history. She has never used smokeless tobacco. She reports that she drinks alcohol. She reports that she uses drugs, including Marijuana, about 7 times per week.    ROS:      Vitals:    05/07/18 1120 05/07/18 1149   BP: 148/94 132/88   BP Location: Right arm Right arm   Patient Position: Sitting    Cuff Size: Adult Adult   Pulse: 63    SpO2: 98%    Weight: 79.4 kg (175 lb)    Height: 160 cm (63\")        EXAM:    Obese female, in good spirits.  Alert and oriented ×4.  Answers all questions appropriately.  No thyromegaly or mass.  No thyroid bruit.  No carotid bruit.  Regular rate and rhythm.  No murmur appreciated.  Abdomen is nontender.  No pedal edema.  Station, gait, and coordination are normal.  She ambulates without the assistance of a cane.    Che was seen today for hypothyroidism and depression.    Diagnoses and all orders for this visit:    Acquired hypothyroidism  -     levothyroxine (SYNTHROID, LEVOTHROID) 75 MCG tablet; Take 1 tablet by mouth Daily.    Recurrent major depressive disorder, in full remission      Labs were reviewed with the patient today.  All appear to be in order, with the exception of her TSH.  For some reason, she is back on the 50 µg dose.  This may have been an oversight at the pharmacy when refills are requested.  We'll go back to the 75 µg dose and recheck labs in 2 months.     Blood pressure was mildly elevated when she first came in.  It has improved.  I have encouraged her to continue with the regular exercise, even if she starts her new job soon.  This will go a long way towards keeping her blood pressure down and keeping her off medicines.    Her mood continues to improve.  She is sleeping better with the trazodone, so we will make no changes there.  Again, the exercise, and possibly the new job, should help with this.  "

## 2018-07-06 ENCOUNTER — OFFICE VISIT (OUTPATIENT)
Dept: FAMILY MEDICINE CLINIC | Facility: CLINIC | Age: 68
End: 2018-07-06

## 2018-07-06 VITALS
BODY MASS INDEX: 30.41 KG/M2 | DIASTOLIC BLOOD PRESSURE: 84 MMHG | HEIGHT: 63 IN | SYSTOLIC BLOOD PRESSURE: 130 MMHG | OXYGEN SATURATION: 97 % | WEIGHT: 171.6 LBS | HEART RATE: 74 BPM

## 2018-07-06 DIAGNOSIS — E03.9 ACQUIRED HYPOTHYROIDISM: Primary | ICD-10-CM

## 2018-07-06 DIAGNOSIS — F33.1 MODERATE EPISODE OF RECURRENT MAJOR DEPRESSIVE DISORDER (HCC): ICD-10-CM

## 2018-07-06 PROCEDURE — 99214 OFFICE O/P EST MOD 30 MIN: CPT | Performed by: INTERNAL MEDICINE

## 2018-07-06 RX ORDER — ESCITALOPRAM OXALATE 10 MG/1
10 TABLET ORAL DAILY
Qty: 30 TABLET | Refills: 1 | Status: SHIPPED | OUTPATIENT
Start: 2018-07-06 | End: 2018-08-30 | Stop reason: SDUPTHER

## 2018-07-06 NOTE — PROGRESS NOTES
Subjective   Che Alcocer is a 67 y.o. female who presents today for:    She is here for her anxiety and depression and concerns about memory.    History of Present Illness   She has chronic depression with anxiety for which she was taking Lexapro.  She stopped taking it months ago because of a perceived lack of benefit.  However, she recognizes that her mood has been much worse off of it.  She has not been taking it for at least the last 3-4 months.       Worried about finances and inability to find a job.  Has been worried about her spouse who recently had a stroke.      Still on trazodone 150 qhs for sleep with good benefit and without side-effect.    She makes use of the [employer] to exercise every day, and she feels this helps her mood.    She also has acquired hypothyroidism treated with levothyroxine.  At the 50 µg dose, her TSH was low.  I recommended she increase it to 75, but she remained on the 50 dose.  At our office visit approximately 4/9/18, it was increased to 75 µg.  It has not been reevaluated with labs.    Ms. Alcocer  reports that she quit smoking about 6 years ago. Her smoking use included Cigarettes. She has a 15.00 pack-year smoking history. She has never used smokeless tobacco. She reports that she drinks alcohol. She reports that she uses drugs, including Marijuana, about 7 times per week.     Allergies   Allergen Reactions   • Penicillins Other (See Comments)     Was told as a child to not take it       Current Outpatient Prescriptions:   •  Calcium Carbonate-Vitamin D (CALCIUM 500/D PO), Take 1 tablet by mouth Daily., Disp: , Rfl:   •  cholecalciferol (VITAMIN D3) 1000 UNITS tablet, Take 1,000 Units by mouth Daily., Disp: , Rfl:   •  Cyanocobalamin (VITAMIN B 12 PO), Take  by mouth., Disp: , Rfl:   •  diclofenac sodium (VOTAREN XR) 100 MG 24 hr tablet, Take 1 tablet by mouth Daily., Disp: 30 tablet, Rfl: 3  •  ferrous sulfate 324 (65 FE) MG tablet delayed-release EC tablet, Take 324 mg by  "mouth Daily With Breakfast., Disp: , Rfl:   •  ibuprofen (ADVIL,MOTRIN) 200 MG tablet, Take 200 mg by mouth Every 6 (Six) Hours As Needed for Mild Pain ., Disp: , Rfl:   •  levothyroxine (SYNTHROID, LEVOTHROID) 75 MCG tablet, Take 1 tablet by mouth Daily., Disp: 90 tablet, Rfl: 1  •  traZODone (DESYREL) 50 MG tablet, Take 3 tablets by mouth Every Night., Disp: 270 tablet, Rfl: 1      Review of Systems   Constitutional: Negative for unexpected weight change.   HENT: Positive for hearing loss (mild).    Respiratory: Negative for shortness of breath.    Cardiovascular: Negative for palpitations.   Endocrine: Negative for cold intolerance and heat intolerance.   Psychiatric/Behavioral: Positive for decreased concentration, dysphoric mood and sleep disturbance. Negative for suicidal ideas. The patient is nervous/anxious.          Objective   Vitals:    07/06/18 1325   BP: 130/84   BP Location: Left arm   Patient Position: Sitting   Cuff Size: Adult   Pulse: 74   SpO2: 97%   Weight: 77.8 kg (171 lb 9.6 oz)   Height: 160 cm (63\")     Physical Exam    Obese, in NAD.  Mood is sad and affect is appropriate; tearful during her exam.    Insight and judgement are normal.  No thyromegaly or mass.  RRR; no murmur or rub.  No LE edema.          Che was seen today for annual exam.    Diagnoses and all orders for this visit:    Acquired hypothyroidism    Moderate episode of recurrent major depressive disorder (CMS/HCC)    Neck thyroid hormone levels now that she is on 75 µg.    We discussed the need to restart a mood medication.  She has had benefit without complete resolution of her symptoms on Lexapro.      20 minutes of the 25 minute office visit were spent counseling the patient regarding her depression and anxiety, coping skills, the need to continue pursuing job opportunities so that she has that purpose in her life, and treatment options.  She agrees with trying Lexapro again.  It has been beneficial for her in the past.  If " it is less beneficial this time, we will consider increasing the dose when we see her in follow-up in 8 weeks.  She knows to contact us in the meantime for any deterioration or new symptoms.

## 2018-07-07 LAB — TSH SERPL DL<=0.005 MIU/L-ACNC: 2.37 UIU/ML (ref 0.45–4.5)

## 2018-07-31 DIAGNOSIS — M25.552 PAIN OF LEFT HIP JOINT: ICD-10-CM

## 2018-07-31 DIAGNOSIS — F51.04 PSYCHOPHYSIOLOGICAL INSOMNIA: ICD-10-CM

## 2018-07-31 RX ORDER — TRAZODONE HYDROCHLORIDE 50 MG/1
150 TABLET ORAL NIGHTLY
Qty: 270 TABLET | Refills: 0 | Status: SHIPPED | OUTPATIENT
Start: 2018-07-31 | End: 2018-11-05 | Stop reason: SDUPTHER

## 2018-08-30 ENCOUNTER — OFFICE VISIT (OUTPATIENT)
Dept: FAMILY MEDICINE CLINIC | Facility: CLINIC | Age: 68
End: 2018-08-30

## 2018-08-30 VITALS
HEART RATE: 59 BPM | SYSTOLIC BLOOD PRESSURE: 136 MMHG | OXYGEN SATURATION: 99 % | WEIGHT: 169.9 LBS | BODY MASS INDEX: 30.11 KG/M2 | DIASTOLIC BLOOD PRESSURE: 90 MMHG | HEIGHT: 63 IN

## 2018-08-30 DIAGNOSIS — F33.42 RECURRENT MAJOR DEPRESSIVE DISORDER, IN FULL REMISSION (HCC): Primary | ICD-10-CM

## 2018-08-30 DIAGNOSIS — E03.9 ACQUIRED HYPOTHYROIDISM: ICD-10-CM

## 2018-08-30 PROCEDURE — 99213 OFFICE O/P EST LOW 20 MIN: CPT | Performed by: INTERNAL MEDICINE

## 2018-08-30 RX ORDER — MELATONIN
1000 DAILY
Qty: 30 TABLET | Refills: 12 | Status: SHIPPED | OUTPATIENT
Start: 2018-08-30 | End: 2022-09-22

## 2018-08-30 RX ORDER — FERROUS SULFATE TAB EC 324 MG (65 MG FE EQUIVALENT) 324 (65 FE) MG
324 TABLET DELAYED RESPONSE ORAL
Qty: 30 TABLET | Refills: 12 | Status: SHIPPED | OUTPATIENT
Start: 2018-08-30 | End: 2022-09-22

## 2018-08-30 RX ORDER — ESCITALOPRAM OXALATE 10 MG/1
15 TABLET ORAL DAILY
Qty: 45 TABLET | Refills: 3 | Status: SHIPPED | OUTPATIENT
Start: 2018-08-30 | End: 2022-09-22

## 2018-08-30 NOTE — PROGRESS NOTES
Chief Complaint   Patient presents with   • Depression     8 week f/u   • Anxiety     She has chronic depression and anxiety treated with Lexapro.  Her mood worsened when she stopped taking it earlier this year.  At our office visit 7/6/18, we restarted the Lexapro.    In addition to the Lexapro, she was going to the [facility] for exercise, and this was helping her mood.    She still takes trazodone 150 mg at bedtime to help with sleep, with good benefit.  She tolerates this without significant side effect.      ROS:  Decreased visual acuity on the right due to a cataract.  Trouble sleeping, better with trazodone, but struggling with the shift work.  No heat or cold intolerance.  No palpitations      Allergies   Allergen Reactions   • Penicillins Other (See Comments)     Was told as a child to not take it     Current Outpatient Prescriptions:   •  Calcium Carbonate-Vitamin D (CALCIUM 500/D PO), Take 1 tablet by mouth Daily., Disp: , Rfl:   •  cholecalciferol (VITAMIN D3) 1000 UNITS tablet, Take 1,000 Units by mouth Daily., Disp: , Rfl:   •  Cyanocobalamin (VITAMIN B 12 PO), Take  by mouth., Disp: , Rfl:   •  diclofenac sodium (VOTAREN XR) 100 MG 24 hr tablet, TAKE 1 TABLET BY MOUTH DAILY., Disp: 30 tablet, Rfl: 3  •  escitalopram (LEXAPRO) 10 MG tablet, Take 1 tablet by mouth Daily., Disp: 30 tablet, Rfl: 1  •  ferrous sulfate 324 (65 FE) MG tablet delayed-release EC tablet, Take 324 mg by mouth Daily With Breakfast., Disp: , Rfl:   •  ibuprofen (ADVIL,MOTRIN) 200 MG tablet, Take 200 mg by mouth Every 6 (Six) Hours As Needed for Mild Pain ., Disp: , Rfl:   •  levothyroxine (SYNTHROID, LEVOTHROID) 75 MCG tablet, Take 1 tablet by mouth Daily., Disp: 90 tablet, Rfl: 1  •  traZODone (DESYREL) 50 MG tablet, TAKE 3 TABLETS BY MOUTH EVERY NIGHT., Disp: 270 tablet, Rfl: 0    She  reports that she quit smoking about 6 years ago. Her smoking use included Cigarettes. She has a 15.00 pack-year smoking history. She has never used  "smokeless tobacco. She reports that she drinks alcohol. She reports that she does not use drugs.      Vitals:    08/30/18 1251   BP: 136/90   BP Location: Left arm   Patient Position: Sitting   Cuff Size: Adult   Pulse: 59   SpO2: 99%   Weight: 77.1 kg (169 lb 14.4 oz)   Height: 160 cm (62.99\")     Overweight female in no acute distress.  No thyromegaly or mass.  Regular rate and rhythm without murmur.  Mood is upbeat and affect is appropriate, although mildly blunted.  Insight and judgment appear to be intact.  She answers all questions appropriately.  She is alert and oriented ×4.  Station, gait, and coordination are normal.          Che was seen today for depression and anxiety.    Diagnoses and all orders for this visit:    Recurrent major depressive disorder, in full remission (CMS/[facility])  -     escitalopram (LEXAPRO) 10 MG tablet; Take 1.5 tablets by mouth Daily.  Try increasing the dose to 15 mg daily.  If side effects develop, or if there is no benefit at this dose, we will cut back to 10 mg.    Continue the trazodone 150 mg at bedtime.    Acquired hypothyroidism  Recheck TSH and adjust dose as indicated.    Follow-up in approximately 4-5 months.          "

## 2018-09-05 RX ORDER — ESCITALOPRAM OXALATE 10 MG/1
TABLET ORAL
Qty: 30 TABLET | Refills: 4 | Status: SHIPPED | OUTPATIENT
Start: 2018-09-05 | End: 2022-09-22 | Stop reason: SDUPTHER

## 2018-11-04 DIAGNOSIS — E03.9 ACQUIRED HYPOTHYROIDISM: ICD-10-CM

## 2018-11-05 DIAGNOSIS — F51.04 PSYCHOPHYSIOLOGICAL INSOMNIA: ICD-10-CM

## 2018-11-05 RX ORDER — TRAZODONE HYDROCHLORIDE 50 MG/1
150 TABLET ORAL NIGHTLY
Qty: 270 TABLET | Refills: 0 | Status: SHIPPED | OUTPATIENT
Start: 2018-11-05 | End: 2022-09-22

## 2018-11-05 RX ORDER — LEVOTHYROXINE SODIUM 0.07 MG/1
TABLET ORAL
Qty: 90 TABLET | Refills: 1 | Status: SHIPPED | OUTPATIENT
Start: 2018-11-05 | End: 2022-09-22 | Stop reason: SDUPTHER

## 2019-02-02 DIAGNOSIS — F51.04 PSYCHOPHYSIOLOGICAL INSOMNIA: ICD-10-CM

## 2019-02-04 RX ORDER — TRAZODONE HYDROCHLORIDE 50 MG/1
150 TABLET ORAL NIGHTLY
Qty: 270 TABLET | Refills: 0 | OUTPATIENT
Start: 2019-02-04

## 2019-02-12 RX ORDER — ESCITALOPRAM OXALATE 10 MG/1
TABLET ORAL
Qty: 45 TABLET | Refills: 1 | OUTPATIENT
Start: 2019-02-12

## 2019-02-16 DIAGNOSIS — F51.04 PSYCHOPHYSIOLOGICAL INSOMNIA: ICD-10-CM

## 2019-02-18 RX ORDER — TRAZODONE HYDROCHLORIDE 50 MG/1
150 TABLET ORAL NIGHTLY
Qty: 270 TABLET | Refills: 0 | OUTPATIENT
Start: 2019-02-18

## 2019-02-23 DIAGNOSIS — F51.04 PSYCHOPHYSIOLOGICAL INSOMNIA: ICD-10-CM

## 2019-02-26 RX ORDER — TRAZODONE HYDROCHLORIDE 50 MG/1
150 TABLET ORAL NIGHTLY
Qty: 270 TABLET | Refills: 0 | OUTPATIENT
Start: 2019-02-26

## 2022-09-22 ENCOUNTER — OFFICE VISIT (OUTPATIENT)
Dept: FAMILY MEDICINE CLINIC | Facility: CLINIC | Age: 72
End: 2022-09-22

## 2022-09-22 ENCOUNTER — PATIENT ROUNDING (BHMG ONLY) (OUTPATIENT)
Dept: FAMILY MEDICINE CLINIC | Facility: CLINIC | Age: 72
End: 2022-09-22

## 2022-09-22 VITALS
SYSTOLIC BLOOD PRESSURE: 100 MMHG | BODY MASS INDEX: 27.49 KG/M2 | DIASTOLIC BLOOD PRESSURE: 78 MMHG | OXYGEN SATURATION: 97 % | HEART RATE: 56 BPM | HEIGHT: 64 IN | WEIGHT: 161 LBS

## 2022-09-22 DIAGNOSIS — E03.9 ACQUIRED HYPOTHYROIDISM: ICD-10-CM

## 2022-09-22 DIAGNOSIS — F41.9 ANXIETY: Primary | ICD-10-CM

## 2022-09-22 PROCEDURE — 99204 OFFICE O/P NEW MOD 45 MIN: CPT | Performed by: NURSE PRACTITIONER

## 2022-09-22 RX ORDER — ESCITALOPRAM OXALATE 10 MG/1
10 TABLET ORAL DAILY
Qty: 28 TABLET | Refills: 5 | Status: SHIPPED | OUTPATIENT
Start: 2022-09-22 | End: 2023-02-21

## 2022-09-22 RX ORDER — LORAZEPAM 0.5 MG/1
0.5 TABLET ORAL EVERY 8 HOURS PRN
Qty: 90 TABLET | Refills: 0 | Status: SHIPPED | OUTPATIENT
Start: 2022-09-22 | End: 2022-11-07 | Stop reason: SDUPTHER

## 2022-09-22 RX ORDER — LEVOTHYROXINE SODIUM 0.07 MG/1
75 TABLET ORAL DAILY
Qty: 28 TABLET | Refills: 5 | Status: SHIPPED | OUTPATIENT
Start: 2022-09-22 | End: 2023-02-21

## 2022-09-22 RX ORDER — CYANOCOBALAMIN (VITAMIN B-12) 500 MCG
500 TABLET ORAL DAILY
Qty: 28 TABLET | Refills: 5 | Status: SHIPPED | OUTPATIENT
Start: 2022-09-22 | End: 2023-02-21

## 2022-09-22 RX ORDER — LORAZEPAM 0.5 MG/1
0.5 TABLET ORAL EVERY 8 HOURS PRN
COMMUNITY
End: 2022-09-22 | Stop reason: SDUPTHER

## 2022-09-22 NOTE — PROGRESS NOTES
"Chief Complaint  Med Refill (Re-establish care)    Subjective        Che Alcocer presents to [facility]  History of Present Illness  Ms. Alcocer presents today to re-establish care at this practice since moving back to the [place] area with her partner. She has developed some dementia at times as reported by herself and her partner. She is needing her medications refilled today. They report she does well on the medications. They also report that neither one of them drive and will need a pharmacy that will deliver to them.       I have reviewed the patient's medical history in detail and updated the computerized patient record.    Current Outpatient Medications:   •  escitalopram (LEXAPRO) 10 MG tablet, Take 1 tablet by mouth Daily., Disp: 28 tablet, Rfl: 5  •  ibuprofen (ADVIL,MOTRIN) 200 MG tablet, Take 200 mg by mouth Every 6 (Six) Hours As Needed for Mild Pain ., Disp: , Rfl:   •  levothyroxine (SYNTHROID, LEVOTHROID) 75 MCG tablet, Take 1 tablet by mouth Daily., Disp: 28 tablet, Rfl: 5  •  LORazepam (ATIVAN) 0.5 MG tablet, Take 1 tablet by mouth Every 8 (Eight) Hours As Needed for Anxiety., Disp: 90 tablet, Rfl: 0  •  Cyanocobalamin (Vitamin B 12) 500 MCG tablet, Take 1 tablet by mouth Daily., Disp: 28 tablet, Rfl: 5       Objective   Vital Signs:  /78 (BP Location: Right arm, Patient Position: Sitting, Cuff Size: Adult)   Pulse 56   Ht 162.6 cm (64\")   Wt 73 kg (161 lb)   SpO2 97%   BMI 27.64 kg/m²   Estimated body mass index is 27.64 kg/m² as calculated from the following:    Height as of this encounter: 162.6 cm (64\").    Weight as of this encounter: 73 kg (161 lb).          Physical Exam  Constitutional:       Appearance: Normal appearance. She is obese.   Cardiovascular:      Rate and Rhythm: Normal rate and regular rhythm.      Pulses: Normal pulses.      Heart sounds: Normal heart sounds.   Pulmonary:      Effort: Pulmonary effort is normal.      Breath " sounds: Normal breath sounds.   Skin:     General: Skin is warm and dry.   Neurological:      Mental Status: She is alert and oriented to person, place, and time.   Psychiatric:         Mood and Affect: Mood normal.         Behavior: Behavior normal.         Thought Content: Thought content normal.         Judgment: Judgment normal.        Result Review :                Assessment and Plan   Diagnoses and all orders for this visit:    1. Anxiety (Primary)  -     escitalopram (LEXAPRO) 10 MG tablet; Take 1 tablet by mouth Daily.  Dispense: 28 tablet; Refill: 5  -     LORazepam (ATIVAN) 0.5 MG tablet; Take 1 tablet by mouth Every 8 (Eight) Hours As Needed for Anxiety.  Dispense: 90 tablet; Refill: 0    2. Acquired hypothyroidism  -     levothyroxine (SYNTHROID, LEVOTHROID) 75 MCG tablet; Take 1 tablet by mouth Daily.  Dispense: 28 tablet; Refill: 5    Other orders  -     Cyanocobalamin (Vitamin B 12) 500 MCG tablet; Take 1 tablet by mouth Daily.  Dispense: 28 tablet; Refill: 5    Ms. Alcocer appears to be doing well today. She does not show any signs of dementia at this time.   I have reordered her medications as noted above. I have advised her to have her meds filled at [facility] because they will put her medications in pill packs and also deliver her medications.   She is to return in 4 weeks for a CSA/CSE/UDT to get refill on her Lorazepam.          Follow Up   Return in about 4 weeks (around 10/20/2022) for CSE/CSA/UDT.  Patient was given instructions and counseling regarding her condition or for health maintenance advice. Please see specific information pulled into the AVS if appropriate.

## 2022-09-22 NOTE — PROGRESS NOTES
Rounded with and welcomed patient during rooming and check in/out.  Patient will follow up at next scheduled office visit.  Patient is re-establishing with provider.

## 2022-11-07 ENCOUNTER — OFFICE VISIT (OUTPATIENT)
Dept: FAMILY MEDICINE CLINIC | Facility: CLINIC | Age: 72
End: 2022-11-07

## 2022-11-07 VITALS
HEART RATE: 55 BPM | WEIGHT: 165 LBS | DIASTOLIC BLOOD PRESSURE: 78 MMHG | BODY MASS INDEX: 28.17 KG/M2 | SYSTOLIC BLOOD PRESSURE: 118 MMHG | OXYGEN SATURATION: 99 % | HEIGHT: 64 IN

## 2022-11-07 DIAGNOSIS — F03.B4 MODERATE DEMENTIA WITH ANXIETY, UNSPECIFIED DEMENTIA TYPE: Primary | ICD-10-CM

## 2022-11-07 DIAGNOSIS — F03.90 DEMENTIA, UNSPECIFIED DEMENTIA SEVERITY, UNSPECIFIED DEMENTIA TYPE, UNSPECIFIED WHETHER BEHAVIORAL, PSYCHOTIC, OR MOOD DISTURBANCE OR ANXIETY: ICD-10-CM

## 2022-11-07 DIAGNOSIS — F41.9 ANXIETY: ICD-10-CM

## 2022-11-07 PROCEDURE — 99214 OFFICE O/P EST MOD 30 MIN: CPT | Performed by: NURSE PRACTITIONER

## 2022-11-07 RX ORDER — DONEPEZIL HYDROCHLORIDE 5 MG/1
5 TABLET, FILM COATED ORAL NIGHTLY
Qty: 30 TABLET | Refills: 5 | Status: SHIPPED | OUTPATIENT
Start: 2022-11-07 | End: 2022-11-09 | Stop reason: SDUPTHER

## 2022-11-07 RX ORDER — LORAZEPAM 0.5 MG/1
0.5 TABLET ORAL EVERY 8 HOURS PRN
Qty: 90 TABLET | Refills: 0 | Status: SHIPPED | OUTPATIENT
Start: 2022-11-07 | End: 2022-11-09 | Stop reason: SDUPTHER

## 2022-11-07 NOTE — PROGRESS NOTES
"Chief Complaint  Anxiety (CSE/CSA)    Subjective        Che Alcocer presents to [facility]  Anxiety  Presents for initial visit. Onset was at an unknown time. Symptoms include decreased concentration, excessive worry, irritability and nervous/anxious behavior. Patient reports no chest pain, depressed mood, insomnia, restlessness or shortness of breath. Symptoms occur most days. The severity of symptoms is moderate. The quality of sleep is good.       Dementia  This is a chronic problem. The current episode started more than 1 year ago. The problem occurs intermittently. The problem has been gradually worsening. Pertinent negatives include no chest pain. The symptoms are aggravated by stress. She has tried nothing for the symptoms. The treatment provided no relief.     I have reviewed the patient's medical history in detail and updated the computerized patient record.      Current Outpatient Medications:   •  Cyanocobalamin (Vitamin B 12) 500 MCG tablet, Take 1 tablet by mouth Daily., Disp: 28 tablet, Rfl: 5  •  escitalopram (LEXAPRO) 10 MG tablet, Take 1 tablet by mouth Daily., Disp: 28 tablet, Rfl: 5  •  ibuprofen (ADVIL,MOTRIN) 200 MG tablet, Take 200 mg by mouth Every 6 (Six) Hours As Needed for Mild Pain ., Disp: , Rfl:   •  levothyroxine (SYNTHROID, LEVOTHROID) 75 MCG tablet, Take 1 tablet by mouth Daily., Disp: 28 tablet, Rfl: 5  •  LORazepam (ATIVAN) 0.5 MG tablet, Take 1 tablet by mouth Every 8 (Eight) Hours As Needed for Anxiety., Disp: 90 tablet, Rfl: 0  •  donepezil (Aricept) 5 MG tablet, Take 1 tablet by mouth Every Night., Disp: 30 tablet, Rfl: 5       Objective   Vital Signs:  /78 (BP Location: Left arm, Patient Position: Sitting, Cuff Size: Adult)   Pulse 55   Ht 162.6 cm (64\")   Wt 74.8 kg (165 lb)   SpO2 99%   BMI 28.32 kg/m²   Estimated body mass index is 28.32 kg/m² as calculated from the following:    Height as of this encounter: 162.6 cm (64\").    " Weight as of this encounter: 74.8 kg (165 lb).          Physical Exam  Vitals reviewed.   Constitutional:       Appearance: Normal appearance.   Skin:     General: Skin is warm and dry.   Neurological:      Mental Status: She is alert and oriented to person, place, and time.   Psychiatric:         Mood and Affect: Mood normal.         Behavior: Behavior normal.         Thought Content: Thought content normal.         Judgment: Judgment normal.        Result Review :                Assessment and Plan   Diagnoses and all orders for this visit:    1. Moderate dementia with anxiety, unspecified dementia type (Primary)    2. Anxiety  -     LORazepam (ATIVAN) 0.5 MG tablet; Take 1 tablet by mouth Every 8 (Eight) Hours As Needed for Anxiety.  Dispense: 90 tablet; Refill: 0    3. Dementia, unspecified dementia severity, unspecified dementia type, unspecified whether behavioral, psychotic, or mood disturbance or anxiety (HCC)  -     donepezil (Aricept) 5 MG tablet; Take 1 tablet by mouth Every Night.  Dispense: 30 tablet; Refill: 5    Ms. Alcocer does not appear to be in any acute distress.  She has signed a controlled substance agreement and provided a urine for UDT.  She is to continue Lorazepam 0.5 mg every 8 hours as needed for anxiety.  I am also starting her on Donepezil 5 mg for her dementia/memory issues.        CSA date: 11/7/2022    Last UDT: 11/7/22    Next UDT: 11/2023    Last CSE: 11/7/22    Next CSE: 2/7/22    MARCIAL report reviewed and verified: 11/7/22    Refills allowed on: 12/7/22, 1/7/23       Follow Up   Return in about 3 months (around 2/7/2023) for CSE.  Patient was given instructions and counseling regarding her condition or for health maintenance advice. Please see specific information pulled into the AVS if appropriate.

## 2022-11-09 RX ORDER — LORAZEPAM 0.5 MG/1
0.5 TABLET ORAL EVERY 8 HOURS PRN
Qty: 90 TABLET | Refills: 0 | Status: SHIPPED | OUTPATIENT
Start: 2022-11-09 | End: 2023-01-09

## 2022-11-09 RX ORDER — DONEPEZIL HYDROCHLORIDE 5 MG/1
5 TABLET, FILM COATED ORAL NIGHTLY
Qty: 30 TABLET | Refills: 5 | Status: SHIPPED | OUTPATIENT
Start: 2022-11-09

## 2023-01-09 DIAGNOSIS — F41.9 ANXIETY: ICD-10-CM

## 2023-01-09 RX ORDER — LORAZEPAM 0.5 MG/1
TABLET ORAL
Qty: 90 TABLET | Refills: 0 | Status: SHIPPED | OUTPATIENT
Start: 2023-01-09 | End: 2023-02-07 | Stop reason: SDUPTHER

## 2023-02-07 ENCOUNTER — OFFICE VISIT (OUTPATIENT)
Dept: FAMILY MEDICINE CLINIC | Facility: CLINIC | Age: 73
End: 2023-02-07
Payer: MEDICARE

## 2023-02-07 VITALS
SYSTOLIC BLOOD PRESSURE: 118 MMHG | DIASTOLIC BLOOD PRESSURE: 67 MMHG | WEIGHT: 169.9 LBS | BODY MASS INDEX: 29.01 KG/M2 | HEIGHT: 64 IN | HEART RATE: 67 BPM | OXYGEN SATURATION: 98 %

## 2023-02-07 DIAGNOSIS — Z79.899 ENCOUNTER FOR LONG-TERM (CURRENT) USE OF HIGH-RISK MEDICATION: ICD-10-CM

## 2023-02-07 DIAGNOSIS — F41.9 ANXIETY: Primary | ICD-10-CM

## 2023-02-07 PROCEDURE — 99213 OFFICE O/P EST LOW 20 MIN: CPT | Performed by: NURSE PRACTITIONER

## 2023-02-07 RX ORDER — LORAZEPAM 0.5 MG/1
0.5 TABLET ORAL EVERY 8 HOURS PRN
Qty: 90 TABLET | Refills: 0 | Status: SHIPPED | OUTPATIENT
Start: 2023-02-07

## 2023-02-07 NOTE — PROGRESS NOTES
"Chief Complaint  Anxiety (Medication refill)    Subjective        Che Alcocer presents to [facility]  History of Present Illness  Ms. Alcocer presents today for her 3 month CSE to get refills on lorazepam for her anxiety. She reports her anxiety is controlled on the lorazepam.     I have reviewed the patient's medical history in detail and updated the computerized patient record.    Current Outpatient Medications:   •  Cyanocobalamin (Vitamin B 12) 500 MCG tablet, Take 1 tablet by mouth Daily., Disp: 28 tablet, Rfl: 5  •  donepezil (Aricept) 5 MG tablet, Take 1 tablet by mouth Every Night., Disp: 30 tablet, Rfl: 5  •  escitalopram (LEXAPRO) 10 MG tablet, Take 1 tablet by mouth Daily., Disp: 28 tablet, Rfl: 5  •  ibuprofen (ADVIL,MOTRIN) 200 MG tablet, Take 200 mg by mouth Every 6 (Six) Hours As Needed for Mild Pain ., Disp: , Rfl:   •  levothyroxine (SYNTHROID, LEVOTHROID) 75 MCG tablet, Take 1 tablet by mouth Daily., Disp: 28 tablet, Rfl: 5  •  LORazepam (ATIVAN) 0.5 MG tablet, Take 1 tablet by mouth Every 8 (Eight) Hours As Needed for Anxiety. for anxiety, Disp: 90 tablet, Rfl: 0     Objective   Vital Signs:  /67 (BP Location: Right arm, Patient Position: Sitting, Cuff Size: Adult)   Pulse 67   Ht 162.6 cm (64.02\")   Wt 77.1 kg (169 lb 14.4 oz)   SpO2 98%   BMI 29.15 kg/m²   Estimated body mass index is 29.15 kg/m² as calculated from the following:    Height as of this encounter: 162.6 cm (64.02\").    Weight as of this encounter: 77.1 kg (169 lb 14.4 oz).             Physical Exam  Constitutional:       Appearance: Normal appearance.   Cardiovascular:      Rate and Rhythm: Normal rate and regular rhythm.      Pulses: Normal pulses.      Heart sounds: Normal heart sounds.   Pulmonary:      Effort: Pulmonary effort is normal.      Breath sounds: Normal breath sounds.   Neurological:      Mental Status: She is alert and oriented to person, place, and time.        Result " Review :                   Assessment and Plan   Diagnoses and all orders for this visit:    1. Anxiety (Primary)  -     LORazepam (ATIVAN) 0.5 MG tablet; Take 1 tablet by mouth Every 8 (Eight) Hours As Needed for Anxiety. for anxiety  Dispense: 90 tablet; Refill: 0    2. Encounter for long-term (current) use of high-risk medication  -     ToxASSURE Select 13 (MW) - Urine, Clean Catch    Ms Alcocer appears to be doing well today.  She has provided a urine for drug screening.  I have refilled the Lorazepam 0.5 mg every 8 hours as needed for anxiety.    CSA date: 11/7/22    Last UDT: 2/7/23    Next UDT: 2/2024    Last CSE: 11/7/22    Next CSE: 5/7/23    MARCIAL report reviewed and verified: 2/7/23    Refills allowed on: 3/7/23, 4/7/23         Follow Up   Return in about 3 months (around 5/7/2023) for CSE.  Patient was given instructions and counseling regarding her condition or for health maintenance advice. Please see specific information pulled into the AVS if appropriate.

## 2023-02-14 LAB — DRUGS UR: NORMAL

## 2023-02-20 DIAGNOSIS — F41.9 ANXIETY: ICD-10-CM

## 2023-02-20 RX ORDER — LORAZEPAM 0.5 MG/1
TABLET ORAL
Qty: 90 TABLET | Refills: 0 | OUTPATIENT
Start: 2023-02-20

## 2023-02-21 DIAGNOSIS — E03.9 ACQUIRED HYPOTHYROIDISM: ICD-10-CM

## 2023-02-21 DIAGNOSIS — F41.9 ANXIETY: ICD-10-CM

## 2023-02-21 RX ORDER — ESCITALOPRAM OXALATE 10 MG/1
TABLET ORAL
Qty: 28 TABLET | Refills: 5 | Status: SHIPPED | OUTPATIENT
Start: 2023-02-21

## 2023-02-21 RX ORDER — CHOLECALCIFEROL (VITAMIN D3) 125 MCG
CAPSULE ORAL
Qty: 28 TABLET | Refills: 5 | Status: SHIPPED | OUTPATIENT
Start: 2023-02-21

## 2023-02-21 RX ORDER — LEVOTHYROXINE SODIUM 0.07 MG/1
TABLET ORAL
Qty: 28 TABLET | Refills: 5 | Status: SHIPPED | OUTPATIENT
Start: 2023-02-21

## 2023-04-17 DIAGNOSIS — F41.9 ANXIETY: ICD-10-CM

## 2023-04-18 RX ORDER — LORAZEPAM 0.5 MG/1
TABLET ORAL
Qty: 90 TABLET | Refills: 0 | OUTPATIENT
Start: 2023-04-18

## 2023-04-28 ENCOUNTER — OFFICE VISIT (OUTPATIENT)
Dept: FAMILY MEDICINE CLINIC | Facility: CLINIC | Age: 73
End: 2023-04-28
Payer: MEDICARE

## 2023-04-28 VITALS
BODY MASS INDEX: 28.58 KG/M2 | OXYGEN SATURATION: 97 % | HEART RATE: 62 BPM | WEIGHT: 167.4 LBS | SYSTOLIC BLOOD PRESSURE: 109 MMHG | DIASTOLIC BLOOD PRESSURE: 74 MMHG | HEIGHT: 64 IN

## 2023-04-28 DIAGNOSIS — F41.9 ANXIETY: ICD-10-CM

## 2023-04-28 PROCEDURE — 99214 OFFICE O/P EST MOD 30 MIN: CPT | Performed by: NURSE PRACTITIONER

## 2023-04-28 PROCEDURE — 1159F MED LIST DOCD IN RCRD: CPT | Performed by: NURSE PRACTITIONER

## 2023-04-28 PROCEDURE — 1160F RVW MEDS BY RX/DR IN RCRD: CPT | Performed by: NURSE PRACTITIONER

## 2023-04-28 RX ORDER — LORAZEPAM 0.5 MG/1
0.5 TABLET ORAL EVERY 8 HOURS PRN
Qty: 90 TABLET | Refills: 0 | Status: SHIPPED | OUTPATIENT
Start: 2023-04-28

## 2023-04-28 RX ORDER — DONEPEZIL HYDROCHLORIDE 10 MG/1
10 TABLET, FILM COATED ORAL NIGHTLY
Qty: 28 TABLET | Refills: 5 | Status: SHIPPED | OUTPATIENT
Start: 2023-04-28

## 2023-04-28 NOTE — PROGRESS NOTES
"Chief Complaint  Med Refill (CSE)    Subjective        Che Alcocer presents to [facility]  History of Present Illness  Ms.. Alcocer presents today to get medication refills for her anxiety. She is currently taking Lorezepam 0.5 mg every 8 hours as needed for anxiety. She is also concerned that the dose of Airisept she is taking is not as effective as it first was and would like to know if the dose can be increase.      I have reviewed the patient's medical history in detail and updated the computerized patient record.      Current Outpatient Medications:   •  escitalopram (LEXAPRO) 10 MG tablet, TAKE ONE TABLET BY MOUTH EVERY DAY, Disp: 28 tablet, Rfl: 5  •  ibuprofen (ADVIL,MOTRIN) 200 MG tablet, Take 1 tablet by mouth Every 6 (Six) Hours As Needed for Mild Pain., Disp: , Rfl:   •  levothyroxine (SYNTHROID, LEVOTHROID) 75 MCG tablet, TAKE ONE TABLET BY MOUTH EVERY DAY, Disp: 28 tablet, Rfl: 5  •  LORazepam (ATIVAN) 0.5 MG tablet, Take 1 tablet by mouth Every 8 (Eight) Hours As Needed for Anxiety. for anxiety, Disp: 90 tablet, Rfl: 0  •  vitamin B-12 (CYANOCOBALAMIN) 500 MCG tablet, TAKE ONE TABLET BY MOUTH EVERY DAY, Disp: 28 tablet, Rfl: 5  •  donepezil (Aricept) 10 MG tablet, Take 1 tablet by mouth Every Night., Disp: 28 tablet, Rfl: 5     Objective   Vital Signs:  /74 (BP Location: Left arm, Patient Position: Sitting, Cuff Size: Adult)   Pulse 62   Ht 162.6 cm (64.02\")   Wt 75.9 kg (167 lb 6.4 oz)   SpO2 97%   BMI 28.72 kg/m²   Estimated body mass index is 28.72 kg/m² as calculated from the following:    Height as of this encounter: 162.6 cm (64.02\").    Weight as of this encounter: 75.9 kg (167 lb 6.4 oz).             Physical Exam  Vitals reviewed.   Constitutional:       Appearance: Normal appearance.   Cardiovascular:      Rate and Rhythm: Normal rate and regular rhythm.      Pulses: Normal pulses.      Heart sounds: Normal heart sounds.   Pulmonary:      " Effort: Pulmonary effort is normal.      Breath sounds: Normal breath sounds.   Neurological:      Mental Status: She is alert and oriented to person, place, and time.   Psychiatric:      Comments: No acute distress.        Result Review :                   Assessment and Plan   Diagnoses and all orders for this visit:    1. Anxiety  -     LORazepam (ATIVAN) 0.5 MG tablet; Take 1 tablet by mouth Every 8 (Eight) Hours As Needed for Anxiety. for anxiety  Dispense: 90 tablet; Refill: 0    Other orders  -     donepezil (Aricept) 10 MG tablet; Take 1 tablet by mouth Every Night.  Dispense: 28 tablet; Refill: 5    Ms. Alcocer appears to be doing well today.   She is to continue Lorazepam 0.5 mg every 8 hours as needed for anxiety.  I have increased the Donepezil to 10 mg nightly.    CSA date: 11/17/22    Last UDT: 2/8/23    Next UDT: 2/2024    Last CSE: 2/8/23    Next CSE: 2/27/23    MARCIAL report reviewed and verified: 4/28/23    Refills allowed on:  5/28/23, 6/28/23       Follow Up   Return in about 3 months (around 7/28/2023), or if symptoms worsen or fail to improve, for Medicare Wellness, Fasting labs 1 week prior to next f/u.  Patient was given instructions and counseling regarding her condition or for health maintenance advice. Please see specific information pulled into the AVS if appropriate.

## 2023-07-28 ENCOUNTER — OFFICE VISIT (OUTPATIENT)
Dept: FAMILY MEDICINE CLINIC | Facility: CLINIC | Age: 73
End: 2023-07-28
Payer: MEDICARE

## 2023-07-28 VITALS
HEIGHT: 64 IN | DIASTOLIC BLOOD PRESSURE: 72 MMHG | OXYGEN SATURATION: 96 % | BODY MASS INDEX: 28.87 KG/M2 | SYSTOLIC BLOOD PRESSURE: 118 MMHG | HEART RATE: 61 BPM | WEIGHT: 169.1 LBS

## 2023-07-28 DIAGNOSIS — E03.9 ACQUIRED HYPOTHYROIDISM: ICD-10-CM

## 2023-07-28 DIAGNOSIS — F41.9 ANXIETY: Primary | ICD-10-CM

## 2023-07-28 DIAGNOSIS — Z79.899 ENCOUNTER FOR LONG-TERM (CURRENT) USE OF HIGH-RISK MEDICATION: ICD-10-CM

## 2023-07-28 DIAGNOSIS — E78.2 MIXED HYPERLIPIDEMIA: ICD-10-CM

## 2023-07-28 PROCEDURE — 99214 OFFICE O/P EST MOD 30 MIN: CPT | Performed by: NURSE PRACTITIONER

## 2023-07-28 PROCEDURE — 1159F MED LIST DOCD IN RCRD: CPT | Performed by: NURSE PRACTITIONER

## 2023-07-28 PROCEDURE — 1160F RVW MEDS BY RX/DR IN RCRD: CPT | Performed by: NURSE PRACTITIONER

## 2023-07-28 RX ORDER — DONEPEZIL HYDROCHLORIDE 5 MG/1
5 TABLET, FILM COATED ORAL
COMMUNITY
Start: 2023-05-26 | End: 2023-07-28 | Stop reason: DRUGHIGH

## 2023-07-28 RX ORDER — LORAZEPAM 0.5 MG/1
0.5 TABLET ORAL EVERY 8 HOURS PRN
Qty: 90 TABLET | Refills: 0 | Status: SHIPPED | OUTPATIENT
Start: 2023-07-28

## 2023-07-31 RX ORDER — CHOLECALCIFEROL (VITAMIN D3) 125 MCG
CAPSULE ORAL
Qty: 28 TABLET | Refills: 5 | Status: SHIPPED | OUTPATIENT
Start: 2023-07-31

## 2023-07-31 RX ORDER — DONEPEZIL HYDROCHLORIDE 10 MG/1
10 TABLET, FILM COATED ORAL NIGHTLY
COMMUNITY

## 2023-08-22 RX ORDER — DONEPEZIL HYDROCHLORIDE 10 MG/1
TABLET, FILM COATED ORAL
Qty: 28 TABLET | Refills: 5 | Status: SHIPPED | OUTPATIENT
Start: 2023-08-22

## 2023-08-29 DIAGNOSIS — F41.9 ANXIETY: ICD-10-CM

## 2023-08-29 RX ORDER — LORAZEPAM 0.5 MG/1
0.5 TABLET ORAL EVERY 8 HOURS PRN
Qty: 90 TABLET | Refills: 0 | Status: SHIPPED | OUTPATIENT
Start: 2023-08-29